# Patient Record
Sex: FEMALE | HISPANIC OR LATINO
[De-identification: names, ages, dates, MRNs, and addresses within clinical notes are randomized per-mention and may not be internally consistent; named-entity substitution may affect disease eponyms.]

---

## 2020-09-22 ENCOUNTER — APPOINTMENT (OUTPATIENT)
Dept: ORTHOPEDIC SURGERY | Facility: CLINIC | Age: 65
End: 2020-09-22

## 2020-09-22 PROBLEM — Z00.00 ENCOUNTER FOR PREVENTIVE HEALTH EXAMINATION: Status: ACTIVE | Noted: 2020-09-22

## 2020-09-24 ENCOUNTER — APPOINTMENT (OUTPATIENT)
Dept: ORTHOPEDIC SURGERY | Facility: CLINIC | Age: 65
End: 2020-09-24
Payer: MEDICARE

## 2020-09-24 VITALS — BODY MASS INDEX: 22.98 KG/M2 | WEIGHT: 114 LBS | HEIGHT: 59 IN

## 2020-09-24 DIAGNOSIS — R20.0 ANESTHESIA OF SKIN: ICD-10-CM

## 2020-09-24 DIAGNOSIS — Z87.39 PERSONAL HISTORY OF OTHER DISEASES OF THE MUSCULOSKELETAL SYSTEM AND CONNECTIVE TISSUE: ICD-10-CM

## 2020-09-24 DIAGNOSIS — Z78.9 OTHER SPECIFIED HEALTH STATUS: ICD-10-CM

## 2020-09-24 DIAGNOSIS — Z86.39 PERSONAL HISTORY OF OTHER ENDOCRINE, NUTRITIONAL AND METABOLIC DISEASE: ICD-10-CM

## 2020-09-24 DIAGNOSIS — Z86.79 PERSONAL HISTORY OF OTHER DISEASES OF THE CIRCULATORY SYSTEM: ICD-10-CM

## 2020-09-24 DIAGNOSIS — Z80.9 FAMILY HISTORY OF MALIGNANT NEOPLASM, UNSPECIFIED: ICD-10-CM

## 2020-09-25 PROBLEM — Z80.9 FAMILY HISTORY OF MALIGNANT NEOPLASM: Status: ACTIVE | Noted: 2020-09-24

## 2020-09-25 PROBLEM — Z86.39 HISTORY OF HIGH CHOLESTEROL: Status: RESOLVED | Noted: 2020-09-24 | Resolved: 2020-09-25

## 2020-09-25 PROBLEM — Z86.39 HISTORY OF DIABETES MELLITUS: Status: RESOLVED | Noted: 2020-09-24 | Resolved: 2020-09-25

## 2020-09-25 PROBLEM — Z87.39 HISTORY OF ARTHRITIS: Status: RESOLVED | Noted: 2020-09-24 | Resolved: 2020-09-25

## 2020-09-25 PROBLEM — R20.0 NUMBNESS OF LEFT LOWER EXTREMITY: Status: ACTIVE | Noted: 2020-09-25

## 2020-09-25 PROBLEM — Z86.79 HISTORY OF HYPERTENSION: Status: RESOLVED | Noted: 2020-09-24 | Resolved: 2020-09-25

## 2020-09-25 PROBLEM — Z78.9 NON-SMOKER: Status: ACTIVE | Noted: 2020-09-24

## 2020-09-25 NOTE — HISTORY OF PRESENT ILLNESS
[de-identified] : 64 year old female presents for evaluation of her left knee.  She had a left total knee replacement in May 2019 by an outside surgeon.  She says she has no pain in the knee, great function and good range of motion.  She is concerned about a patch of numbness on her knee and wants it evaluated.  She also reports right knee pain.  The right knee pain is worse when going up the stairs.  She has arthritis in the right knee and says the symptoms are not bad enough for her to take medications.

## 2020-09-25 NOTE — ASSESSMENT
[FreeTextEntry1] : 64 year old female with patch of numbness over her left knee after a left total knee arthroplasty 1 year ago.  She has no pain in the left knee and has great range of motion and function.  She has a great outcome from her total knee arthroplasty.  The patch of numbness on her knee is from the sacrifice of some branches of the infrapatellar nerves during the surgical approach for the total knee.  These are branches from the saphenous nerve.  This is not uncommon to have after a total knee replacement.  The patch of numbness is unlikely to go away but will not impact her outcome.  She also has right knee pain and arthritis. At this time she will do home exercises and she was given an exercise sheet.  She will call to make a followup appointment.  She knows to call with any questions or concerns or if her symptoms acutely worsen.

## 2020-09-25 NOTE — PHYSICAL EXAM
[de-identified] : General: No acute distress, conversant, well-nourished.\par Head: Normocephalic, atraumatic\par Neck: trachea midline, FROM\par Heart: normotensive and normal rate and rhythm\par Lungs: No labored breathing\par Skin: No abrasions, no rashes, no edema\par Psych: Alert and oriented to person, place and time\par Extremities: no peripheral edema or digital cyanosis\par Gait: Normal gait. Can perform tandem gait.  \par Vascular: warm and well perfused distally, palpable distal pulses\par \par Left knee with no erythema, no effusion.  \par well-healed incision\par No tenderness to palpation of knee.\par patch of numbness just lateral to inferior part of her incision\par \par Range of motion: 0 - 120 degrees\par No pain with range of motion.\par Stable to varus and valgus stress.\par Sensation intact to light touch distally.  \par Normal motor exam.\par Warm and well perfused distally.\par \par Right Knee with no erythema, no effusion.  \par No tenderness to palpation of knee.\par No medial joint line tenderness.\par No lateral joint line tenderness.\par \par Range of motion: 0 - 130 degrees\par Mild pain with range of motion.\par \par Negative Paul's test.\par Stable to varus and valgus stress.\par Negative Lachman's test, negative anterior and posterior drawer tests.  \par \par Sensation intact to light touch.  \par Normal motor exam.\par Warm and well perfused distally. [de-identified] : Left knee AP, lateral and sunrise views obtained in the office today shows no fracture or dislocation.  There is a well-placed total knee arthroplasty with no evidence of loosening or hardware complication.

## 2021-06-29 ENCOUNTER — APPOINTMENT (OUTPATIENT)
Dept: ORTHOPEDIC SURGERY | Facility: CLINIC | Age: 66
End: 2021-06-29
Payer: MEDICARE

## 2021-06-29 DIAGNOSIS — M25.561 PAIN IN RIGHT KNEE: ICD-10-CM

## 2021-06-29 RX ORDER — CELECOXIB 200 MG/1
200 CAPSULE ORAL TWICE DAILY
Qty: 60 | Refills: 2 | Status: ACTIVE | COMMUNITY
Start: 2021-06-29 | End: 1900-01-01

## 2021-06-29 NOTE — HISTORY OF PRESENT ILLNESS
[de-identified] : 64 year old female followup for acute exacerbation of right knee pain secondary to osteoarthritis.  She takes Tylenol which helps. She says it is worse with activity, especially stairs.  She denies any instability.

## 2021-06-29 NOTE — ASSESSMENT
[FreeTextEntry1] : 65 year old female followup for acute exacerbation of chronic right knee pain.  Radiographs were reviewed today showing tricompartmental osteoarthritis.  She was given a prescription for Celebrex. She declined PT and will do home exercises.  We discussed a corticosteroid injection but the patient declined.  She will followup in 3 months. We discussed red flag symptoms that would require emergent evaluation. She knows to call with any questions or concerns or if her symptoms acutely worsen.

## 2021-06-29 NOTE — PHYSICAL EXAM
[de-identified] : General: No acute distress, conversant, well-nourished.\par Head: Normocephalic, atraumatic\par Neck: trachea midline, FROM\par Heart: normotensive and normal rate and rhythm\par Lungs: No labored breathing\par Skin: No abrasions, no rashes, no edema\par Psych: Alert and oriented to person, place and time\par Extremities: no peripheral edema or digital cyanosis\par Gait: Normal gait. Can perform tandem gait.  \par Vascular: warm and well perfused distally, palpable distal pulses\par \par Right Knee with no erythema, no effusion.  \par + tenderness to palpation of knee.\par \par Range of motion: 0 - 130 degrees\par + pain with range of motion.\par \par Negative Paul's test.\par Stable to varus and valgus stress.\par Negative Lachman's test, negative anterior and posterior drawer tests.  \par \par Sensation intact to light touch.  \par Normal motor exam.\par Warm and well perfused distally. [de-identified] : I ordered right knee radiographs to evaluate the patient's knee pain.\par \par Right 3 view radiographs obtained in the office today shows no fracture or dislocation.  Tricompartmental osteoarthritis

## 2021-10-05 ENCOUNTER — APPOINTMENT (OUTPATIENT)
Dept: ORTHOPEDIC SURGERY | Facility: CLINIC | Age: 66
End: 2021-10-05